# Patient Record
Sex: FEMALE | Race: WHITE | NOT HISPANIC OR LATINO
[De-identification: names, ages, dates, MRNs, and addresses within clinical notes are randomized per-mention and may not be internally consistent; named-entity substitution may affect disease eponyms.]

---

## 2023-03-23 ENCOUNTER — APPOINTMENT (OUTPATIENT)
Dept: OBGYN | Facility: CLINIC | Age: 53
End: 2023-03-23
Payer: COMMERCIAL

## 2023-03-23 ENCOUNTER — ASOB RESULT (OUTPATIENT)
Age: 53
End: 2023-03-23

## 2023-03-23 VITALS
SYSTOLIC BLOOD PRESSURE: 106 MMHG | BODY MASS INDEX: 24.11 KG/M2 | HEART RATE: 94 BPM | HEIGHT: 66 IN | WEIGHT: 150 LBS | DIASTOLIC BLOOD PRESSURE: 67 MMHG

## 2023-03-23 DIAGNOSIS — Z00.00 ENCOUNTER FOR GENERAL ADULT MEDICAL EXAMINATION W/OUT ABNORMAL FINDINGS: ICD-10-CM

## 2023-03-23 PROCEDURE — 76856 US EXAM PELVIC COMPLETE: CPT | Mod: 59

## 2023-03-23 PROCEDURE — 76830 TRANSVAGINAL US NON-OB: CPT

## 2023-03-23 PROCEDURE — 99396 PREV VISIT EST AGE 40-64: CPT

## 2023-03-23 PROCEDURE — 99213 OFFICE O/P EST LOW 20 MIN: CPT | Mod: 25

## 2023-03-23 NOTE — HISTORY OF PRESENT ILLNESS
[FreeTextEntry1] : Patient is a 52-year-old  2 para 2 last menstrual period \par Patient presents for annual visit and states she had a cycle in  then again  that lasted approximately 2 weeks

## 2023-03-23 NOTE — PHYSICAL EXAM
[Chaperone Present] : A chaperone was present in the examining room during all aspects of the physical examination [Appropriately responsive] : appropriately responsive [Alert] : alert [No Acute Distress] : no acute distress [No Lymphadenopathy] : no lymphadenopathy [Regular Rate Rhythm] : regular rate rhythm [No Murmurs] : no murmurs [Clear to Auscultation B/L] : clear to auscultation bilaterally [Soft] : soft [Non-tender] : non-tender [Non-distended] : non-distended [No HSM] : No HSM [No Lesions] : no lesions [No Mass] : no mass [Oriented x3] : oriented x3 [Examination Of The Breasts] : a normal appearance [No Masses] : no breast masses were palpable [Labia Majora] : normal [Labia Minora] : normal [Normal] : normal [Anteversion] : anteverted [Uterine Adnexae] : normal [FreeTextEntry6] : No masses, nontender, no skin changes, nipple discharge, no adenopathy [Tenderness] : nontender [Mass ___ cm] : no uterine mass was palpated

## 2023-03-23 NOTE — PLAN
[FreeTextEntry1] : Patient is a 52-year-old  2 para 2 last menstrual period 2023\par Patient presents for annual visit and states she had a cycle back on  then again  at which point the cycle lasted approximately 2 weeks since then patient had a cycle back on \par Physical exam reveals a well-developed well-nourished female no apparent distress,, BMI 24\par Heart regular rhythm and rate, lungs clear, breast no mass nontender no skin change or nipple discharge no adenopathy, abdomen soft nontender no organomegaly.\par Pelvic exam shows normal female external genitalia, vagina no lesions, cervix appropriate size nontender, uterus anteverted normal size nontender, adnexa no mass nontender.\par Pap smear was performed\par Patient will be given a prescription for breast mammogram\par Patient underwent a pelvic sonogram\par Uterus 8.97 x 5.71 x 7.14\par Cervix measures 3.05 centimeters\par Endometrium is measuring thickness of 22.3 mm\par Right ovary 2.77 x 2.36 x 1.98 normal appearance\par Left ovary 2.37 x 2.48 x 1.7 again normal appearance\par No adnexal masses seen\par No free fluid seen in the cul-de-sac\par Patient will be scheduled for endometrial biopsy to further assess the endometrial lining since there is a history of the prolonged bleeding and findings of extremely thickened endometrium\par Findings discussed with the patient\par Questions answered\par Patient that she understands

## 2023-03-30 LAB — HPV HIGH+LOW RISK DNA PNL CVX: NOT DETECTED

## 2023-04-06 ENCOUNTER — APPOINTMENT (OUTPATIENT)
Dept: OBGYN | Facility: CLINIC | Age: 53
End: 2023-04-06
Payer: COMMERCIAL

## 2023-04-06 VITALS
DIASTOLIC BLOOD PRESSURE: 65 MMHG | HEART RATE: 94 BPM | WEIGHT: 150 LBS | HEIGHT: 66 IN | SYSTOLIC BLOOD PRESSURE: 101 MMHG | BODY MASS INDEX: 24.11 KG/M2

## 2023-04-06 PROCEDURE — 99213 OFFICE O/P EST LOW 20 MIN: CPT | Mod: 25

## 2023-04-06 PROCEDURE — 58100 BIOPSY OF UTERUS LINING: CPT

## 2023-04-06 PROCEDURE — 81025 URINE PREGNANCY TEST: CPT

## 2023-04-06 NOTE — ASSESSMENT
[FreeTextEntry1] : Patient is a 52-year-old  2 para 2 last menstrual period 2023 who presents for endometrial sampling due to endometrial thickness being above normal on the previous sonogram\par Patient placed in the exam room and after informed consent obtained and signed patient was placed in the dorsolithotomy position\par Sterile speculum space in the vaginal vault and cervix and vaginal vault cleansed with Betadine\par Anterior lip of the cervix grasped with a single-tooth tenaculum\par Endometrial Pipelle easily passed endometrial cavity approximately 8 cm and endometrial tissue obtained cytopathology\par All instruments removed from the vaginal vault\par All instrument counts correct\par Patient to return in 2 weeks to discuss pathology report

## 2023-04-06 NOTE — PROCEDURE
[Endometrial Biopsy] : Endometrial biopsy [Consent Obtained] : Consent obtained [Pre-op Evaluation] : Pre-op evaluation [Thickened Endometrium] : thickened endometrium [Alternatives] : alternatives [Patient] : patient [Infection] : infection [Bleeding] : bleeding [Negative] : negative pregnancy test [No Premedication] : No premedication [None] : none [Tenaculum] : Tenaculum [Easy Passage] : Easy passage [Sounded to ___ cm] : sounded to [unfilled] ~Ucm [Anteverted] : anteverted [Scant] : scant [Specimen Collected] : collected [Sent to Pathology] : placed in buffered formalin and sent for pathology [Tolerated Well] : Patient tolerated the procedure well [No Complications] : No complications [LMPDate] : 3/30/2023 [de-identified] : Single-tooth tenaculum,, Pipelle endometrial biopsy

## 2023-04-18 ENCOUNTER — APPOINTMENT (OUTPATIENT)
Dept: OBGYN | Facility: CLINIC | Age: 53
End: 2023-04-18
Payer: COMMERCIAL

## 2023-04-18 LAB — CORE LAB BIOPSY: NORMAL

## 2023-04-18 PROCEDURE — 99213 OFFICE O/P EST LOW 20 MIN: CPT

## 2023-04-18 NOTE — PLAN
[FreeTextEntry1] : Patient is a 52-year-old  2 para 2 last menstrual period 2023\par Patient presents for follow-up after undergoing endometrial biopsy on 2023 for heavy periods and thickened endometrial lining\par Pathology report shows proliferative endometrium\par Results discussed with patient\par Findings for benign etiology\par Patient reassured\par Options of conservative therapy with no treatment versus medical treatment with hormones versus surgical options discussed\par Advised patient to be conservative this point and just monitor her cycles over the next few months and if they become severe and bothersome we will consider medical therapy versus surgical therapy\par Questions answered\par Patient that she understands

## 2023-04-18 NOTE — HISTORY OF PRESENT ILLNESS
[FreeTextEntry1] : She is a 52-year-old  2 para 2 last menstrual 2023\par Patient presents for follow-up visit after undergoing endometrial biopsy on  for heavy periods and thickened lining

## 2024-07-23 ENCOUNTER — APPOINTMENT (OUTPATIENT)
Dept: OBGYN | Facility: CLINIC | Age: 54
End: 2024-07-23
Payer: COMMERCIAL

## 2024-07-23 VITALS
BODY MASS INDEX: 26.2 KG/M2 | SYSTOLIC BLOOD PRESSURE: 123 MMHG | DIASTOLIC BLOOD PRESSURE: 73 MMHG | WEIGHT: 163 LBS | HEART RATE: 99 BPM | HEIGHT: 66 IN

## 2024-07-23 DIAGNOSIS — R93.89 ABNORMAL FINDINGS ON DIAGNOSTIC IMAGING OF OTHER SPECIFIED BODY STRUCTURES: ICD-10-CM

## 2024-07-23 DIAGNOSIS — Z12.31 ENCOUNTER FOR SCREENING MAMMOGRAM FOR MALIGNANT NEOPLASM OF BREAST: ICD-10-CM

## 2024-07-23 DIAGNOSIS — R92.333 MAMMOGRAPHIC HETEROGENEOUS DENSITY, BILATERAL BREASTS: ICD-10-CM

## 2024-07-23 DIAGNOSIS — Z11.51 ENCOUNTER FOR SCREENING FOR HUMAN PAPILLOMAVIRUS (HPV): ICD-10-CM

## 2024-07-23 DIAGNOSIS — N92.1 EXCESSIVE AND FREQUENT MENSTRUATION WITH IRREGULAR CYCLE: ICD-10-CM

## 2024-07-23 DIAGNOSIS — Z01.419 ENCOUNTER FOR GYNECOLOGICAL EXAMINATION (GENERAL) (ROUTINE) W/OUT ABNORMAL FINDINGS: ICD-10-CM

## 2024-07-23 DIAGNOSIS — Z12.39 ENCOUNTER FOR OTHER SCREENING FOR MALIGNANT NEOPLASM OF BREAST: ICD-10-CM

## 2024-07-23 DIAGNOSIS — Z12.4 ENCOUNTER FOR SCREENING FOR MALIGNANT NEOPLASM OF CERVIX: ICD-10-CM

## 2024-07-23 PROCEDURE — 99396 PREV VISIT EST AGE 40-64: CPT

## 2024-07-23 PROCEDURE — 99459 PELVIC EXAMINATION: CPT

## 2024-07-24 ENCOUNTER — NON-APPOINTMENT (OUTPATIENT)
Age: 54
End: 2024-07-24

## 2024-07-24 NOTE — PLAN
[FreeTextEntry1] : Patient is a 53-year-old  2 para 2 last menstrual period 2024 Patient presents for annual visit,, denies any complaints Physical exam reveals a well-developed well-nourished female no apparent distress,, BMI 26 Heart regular rhythm and rate, lungs clear, breast no mass nontender no skin change no nipple discharge adenopathy, abdomen soft nontender no organomegaly Pelvic exam shows normal female external genitalia, vagina no lesions, cervix appropriate size nontender, uterus anteverted normal size nontender, adnexa no mass nontender Pap smear was performed Patient will receive a prescription for breast mammogram and sonogram Patient states she will be scheduling colonoscopy Essentially benign GYN exam Follow-up 1 year or prior to that as needed  Arlen was present as a chaperone for the entire assessment and examination of this patient

## 2024-07-24 NOTE — HISTORY OF PRESENT ILLNESS
[FreeTextEntry1] : Patient is a 54-year-old  2 para 2 last menstrual 2024 Patient presents for annual visit,, denies any complaints

## 2024-07-24 NOTE — PHYSICAL EXAM
[Chaperone Present] : A chaperone was present in the examining room during all aspects of the physical examination [[id number]] : A chaperone was present during the pelvic exam. [FreeTextEntry2] : Arlen [Appropriately responsive] : appropriately responsive [Alert] : alert [No Acute Distress] : no acute distress [No Lymphadenopathy] : no lymphadenopathy [Regular Rate Rhythm] : regular rate rhythm [No Murmurs] : no murmurs [Clear to Auscultation B/L] : clear to auscultation bilaterally [Soft] : soft [Non-tender] : non-tender [Non-distended] : non-distended [No HSM] : No HSM [No Lesions] : no lesions [No Mass] : no mass [Oriented x3] : oriented x3 [FreeTextEntry6] : No masses, nontender, no skin changes, no nipple discharge, no adenopathy. [Examination Of The Breasts] : a normal appearance [No Masses] : no breast masses were palpable [Labia Majora] : normal [Labia Minora] : normal [Normal] : normal [Tenderness] : nontender [Anteversion] : anteverted [Mass ___ cm] : no uterine mass was palpated [Uterine Adnexae] : normal

## 2024-07-24 NOTE — PHYSICAL EXAM
[Chaperone Present] : A chaperone was present in the examining room during all aspects of the physical examination [[id number]] : A chaperone was present during the pelvic exam. [FreeTextEntry2] : Arlen [Appropriately responsive] : appropriately responsive [Alert] : alert [No Acute Distress] : no acute distress [No Lymphadenopathy] : no lymphadenopathy [Regular Rate Rhythm] : regular rate rhythm [No Murmurs] : no murmurs [Clear to Auscultation B/L] : clear to auscultation bilaterally [Soft] : soft [Non-tender] : non-tender [Non-distended] : non-distended [No HSM] : No HSM [No Lesions] : no lesions [No Mass] : no mass [Oriented x3] : oriented x3 [FreeTextEntry6] : No masses, nontender, no skin changes, no nipple discharge, no adenopathy. [Examination Of The Breasts] : a normal appearance [No Masses] : no breast masses were palpable [Labia Majora] : normal [Labia Minora] : normal [Normal] : normal [Tenderness] : nontender [Anteversion] : anteverted [Mass ___ cm] : no uterine mass was palpated [Uterine Adnexae] : normal

## 2024-07-29 LAB — HPV HIGH+LOW RISK DNA PNL CVX: NOT DETECTED

## 2025-01-13 DIAGNOSIS — Z12.39 ENCOUNTER FOR OTHER SCREENING FOR MALIGNANT NEOPLASM OF BREAST: ICD-10-CM

## 2025-01-13 DIAGNOSIS — N63.0 UNSPECIFIED LUMP IN UNSPECIFIED BREAST: ICD-10-CM

## 2025-01-13 DIAGNOSIS — N60.02 SOLITARY CYST OF LEFT BREAST: ICD-10-CM

## 2025-07-29 ENCOUNTER — APPOINTMENT (OUTPATIENT)
Dept: OBGYN | Facility: CLINIC | Age: 55
End: 2025-07-29
Payer: COMMERCIAL

## 2025-07-29 ENCOUNTER — NON-APPOINTMENT (OUTPATIENT)
Age: 55
End: 2025-07-29

## 2025-07-29 VITALS
HEART RATE: 101 BPM | DIASTOLIC BLOOD PRESSURE: 69 MMHG | WEIGHT: 159 LBS | HEIGHT: 66 IN | BODY MASS INDEX: 25.55 KG/M2 | SYSTOLIC BLOOD PRESSURE: 122 MMHG

## 2025-07-29 DIAGNOSIS — R92.1 MAMMOGRAPHIC CALCIFICATION FOUND ON DIAGNOSTIC IMAGING OF BREAST: ICD-10-CM

## 2025-07-29 DIAGNOSIS — Z12.4 ENCOUNTER FOR SCREENING FOR MALIGNANT NEOPLASM OF CERVIX: ICD-10-CM

## 2025-07-29 PROCEDURE — 99459 PELVIC EXAMINATION: CPT

## 2025-07-29 PROCEDURE — 99396 PREV VISIT EST AGE 40-64: CPT

## 2025-08-05 LAB — HPV HIGH+LOW RISK DNA PNL CVX: NOT DETECTED
